# Patient Record
Sex: FEMALE | Race: BLACK OR AFRICAN AMERICAN | HISPANIC OR LATINO
[De-identification: names, ages, dates, MRNs, and addresses within clinical notes are randomized per-mention and may not be internally consistent; named-entity substitution may affect disease eponyms.]

---

## 2022-11-17 PROBLEM — Z00.00 ENCOUNTER FOR PREVENTIVE HEALTH EXAMINATION: Status: ACTIVE | Noted: 2022-11-17

## 2022-11-22 ENCOUNTER — APPOINTMENT (OUTPATIENT)
Dept: OBGYN | Facility: CLINIC | Age: 42
End: 2022-11-22

## 2023-04-13 ENCOUNTER — NON-APPOINTMENT (OUTPATIENT)
Age: 43
End: 2023-04-13

## 2023-04-14 ENCOUNTER — APPOINTMENT (OUTPATIENT)
Dept: OBGYN | Facility: CLINIC | Age: 43
End: 2023-04-14

## 2023-04-15 ENCOUNTER — NON-APPOINTMENT (OUTPATIENT)
Age: 43
End: 2023-04-15

## 2023-05-02 ENCOUNTER — APPOINTMENT (OUTPATIENT)
Dept: OBGYN | Facility: CLINIC | Age: 43
End: 2023-05-02
Payer: MEDICARE

## 2023-05-02 VITALS
DIASTOLIC BLOOD PRESSURE: 60 MMHG | SYSTOLIC BLOOD PRESSURE: 100 MMHG | BODY MASS INDEX: 30.22 KG/M2 | WEIGHT: 177 LBS | HEIGHT: 64 IN

## 2023-05-02 DIAGNOSIS — R87.810 CERVICAL HIGH RISK HUMAN PAPILLOMAVIRUS (HPV) DNA TEST POSITIVE: ICD-10-CM

## 2023-05-02 DIAGNOSIS — R23.2 FLUSHING: ICD-10-CM

## 2023-05-02 DIAGNOSIS — Z78.9 OTHER SPECIFIED HEALTH STATUS: ICD-10-CM

## 2023-05-02 DIAGNOSIS — Z82.0 FAMILY HISTORY OF EPILEPSY AND OTHER DISEASES OF THE NERVOUS SYSTEM: ICD-10-CM

## 2023-05-02 DIAGNOSIS — G62.9 POLYNEUROPATHY, UNSPECIFIED: ICD-10-CM

## 2023-05-02 DIAGNOSIS — Z82.49 FAMILY HISTORY OF ISCHEMIC HEART DISEASE AND OTHER DISEASES OF THE CIRCULATORY SYSTEM: ICD-10-CM

## 2023-05-02 DIAGNOSIS — Z87.42 PERSONAL HISTORY OF OTHER DISEASES OF THE FEMALE GENITAL TRACT: ICD-10-CM

## 2023-05-02 DIAGNOSIS — Z83.49 FAMILY HISTORY OF OTHER ENDOCRINE, NUTRITIONAL AND METABOLIC DISEASES: ICD-10-CM

## 2023-05-02 PROCEDURE — 99203 OFFICE O/P NEW LOW 30 MIN: CPT | Mod: 25

## 2023-05-03 ENCOUNTER — TRANSCRIPTION ENCOUNTER (OUTPATIENT)
Age: 43
End: 2023-05-03

## 2023-05-03 LAB
ALBUMIN SERPL ELPH-MCNC: 4.5 G/DL
ALP BLD-CCNC: 51 U/L
ALT SERPL-CCNC: 12 U/L
ANION GAP SERPL CALC-SCNC: 11 MMOL/L
AST SERPL-CCNC: 19 U/L
BILIRUB SERPL-MCNC: 0.3 MG/DL
BUN SERPL-MCNC: 17 MG/DL
C TRACH RRNA SPEC QL NAA+PROBE: NOT DETECTED
CALCIUM SERPL-MCNC: 9.1 MG/DL
CHLORIDE SERPL-SCNC: 102 MMOL/L
CO2 SERPL-SCNC: 26 MMOL/L
CREAT SERPL-MCNC: 0.74 MG/DL
DHEA-S SERPL-MCNC: 89.2 UG/DL
EGFR: 103 ML/MIN/1.73M2
ESTIMATED AVERAGE GLUCOSE: 97 MG/DL
ESTRADIOL SERPL-MCNC: 254 PG/ML
FSH SERPL-MCNC: 5.8 IU/L
GLUCOSE SERPL-MCNC: 84 MG/DL
HBA1C MFR BLD HPLC: 5 %
HBV SURFACE AG SER QL: NONREACTIVE
HCT VFR BLD CALC: 40.5 %
HCV AB SER QL: NONREACTIVE
HCV S/CO RATIO: 0.08 S/CO
HGB BLD-MCNC: 13 G/DL
HIV1+2 AB SPEC QL IA.RAPID: NONREACTIVE
INSULIN SERPL-MCNC: <0.4 UU/ML
LH SERPL-ACNC: 10.9 IU/L
MCHC RBC-ENTMCNC: 32.1 GM/DL
MCHC RBC-ENTMCNC: 33.7 PG
MCV RBC AUTO: 104.9 FL
N GONORRHOEA RRNA SPEC QL NAA+PROBE: NOT DETECTED
PLATELET # BLD AUTO: 236 K/UL
POTASSIUM SERPL-SCNC: 4.1 MMOL/L
PROGEST SERPL-MCNC: 4.3 NG/ML
PROLACTIN SERPL-MCNC: 9.4 NG/ML
PROT SERPL-MCNC: 7.3 G/DL
RBC # BLD: 3.86 M/UL
RBC # FLD: 13.6 %
SODIUM SERPL-SCNC: 139 MMOL/L
SOURCE AMPLIFICATION: NORMAL
SOURCE TP AMPLIFICATION: NORMAL
T PALLIDUM AB SER QL IA: NEGATIVE
T VAGINALIS RRNA SPEC QL NAA+PROBE: NOT DETECTED
T4 FREE SERPL-MCNC: 0.8 NG/DL
TESTOST SERPL-MCNC: 12.2 NG/DL
TSH SERPL-ACNC: 0.98 UIU/ML
WBC # FLD AUTO: 5.7 K/UL

## 2023-05-04 PROBLEM — Z78.9 DOES NOT USE ILLICIT DRUGS: Status: ACTIVE | Noted: 2023-05-04

## 2023-05-04 PROBLEM — Z78.9 NON-SMOKER: Status: ACTIVE | Noted: 2023-05-04

## 2023-05-04 PROBLEM — G62.9 POLYNEUROPATHY: Status: ACTIVE | Noted: 2023-05-04

## 2023-05-04 PROBLEM — Z83.49 FAMILY HISTORY OF THYROID DISEASE: Status: ACTIVE | Noted: 2023-05-04

## 2023-05-04 PROBLEM — Z87.42 HISTORY OF TERMINATION OF PREGNANCY: Status: RESOLVED | Noted: 2023-05-04 | Resolved: 2023-05-04

## 2023-05-04 PROBLEM — Z82.0 FAMILY HISTORY OF SLEEP APNEA: Status: ACTIVE | Noted: 2023-05-04

## 2023-05-04 PROBLEM — Z82.49 FAMILY HISTORY OF ACUTE MYOCARDIAL INFARCTION: Status: ACTIVE | Noted: 2023-05-04

## 2023-05-04 PROBLEM — Z78.9 NO HISTORY OF ALCOHOL USE: Status: ACTIVE | Noted: 2023-05-04

## 2023-05-04 LAB
HPV HIGH+LOW RISK DNA PNL CVX: NOT DETECTED
T3FREE SERPL-MCNC: 2.11 PG/ML

## 2023-05-04 NOTE — REVIEW OF SYSTEMS
[Night Sweats] : night sweats [Recent Weight Gain (___ Lbs)] : recent [unfilled] ~Ulb weight gain [Nausea] : nausea [Myalgias] : myalgias [Joint Stiffness] : joint stiffness [Back Pain] : back pain [Hot Flashes] : hot flashes [Easy Bruising] : easy bruising [Negative] : Heme/Lymph

## 2023-05-04 NOTE — PLAN
[FreeTextEntry1] : TVUS\par Blood work drawn.\par Discussed decreased fertility due to age and ev ivf iui etc.\par \par \par Mammo/sono yearly.

## 2023-05-04 NOTE — HISTORY OF PRESENT ILLNESS
[PGHxTotal] : 2 [[facility] : [facility] [PGHxPremature] : 0 [PGHxAbortions] : 1 [[facility] Centeriving] : 1 [PGHxABInduced] : 1 [FreeTextEntry1] : 2009 c/s 37 weeks 5#13 oligohydramnios female [Frequency: Q ___ days] : menstrual periods occur approximately every [unfilled] days [Menarche Age: ____] : age at menarche was [unfilled] [Currently Active] : currently active [Men] : men [Vaginal] : vaginal

## 2023-05-05 ENCOUNTER — NON-APPOINTMENT (OUTPATIENT)
Age: 43
End: 2023-05-05

## 2023-05-08 LAB — CYTOLOGY CVX/VAG DOC THIN PREP: ABNORMAL

## 2023-05-30 ENCOUNTER — APPOINTMENT (OUTPATIENT)
Dept: OBGYN | Facility: CLINIC | Age: 43
End: 2023-05-30
Payer: MEDICARE

## 2023-05-30 VITALS
SYSTOLIC BLOOD PRESSURE: 106 MMHG | HEIGHT: 64 IN | DIASTOLIC BLOOD PRESSURE: 64 MMHG | WEIGHT: 178 LBS | BODY MASS INDEX: 30.39 KG/M2

## 2023-05-30 DIAGNOSIS — N83.8 OTHER NONINFLAMMATORY DISORDERS OF OVARY, FALLOPIAN TUBE AND BROAD LIGAMENT: ICD-10-CM

## 2023-05-30 PROCEDURE — 99213 OFFICE O/P EST LOW 20 MIN: CPT | Mod: 25

## 2023-05-30 PROCEDURE — 76830 TRANSVAGINAL US NON-OB: CPT

## 2023-05-30 RX ORDER — AMOXICILLIN 500 MG/1
500 CAPSULE ORAL
Qty: 15 | Refills: 0 | Status: COMPLETED | COMMUNITY
Start: 2022-12-13

## 2023-05-30 RX ORDER — ETODOLAC 400 MG/1
400 TABLET, FILM COATED ORAL
Qty: 10 | Refills: 0 | Status: COMPLETED | COMMUNITY
Start: 2022-12-13

## 2023-05-30 RX ORDER — IBUPROFEN 600 MG/1
600 TABLET, FILM COATED ORAL
Qty: 20 | Refills: 0 | Status: COMPLETED | COMMUNITY
Start: 2023-02-27

## 2023-05-30 RX ORDER — ACETAMINOPHEN EXTRA STRENGTH 500 MG/1
500 TABLET ORAL
Qty: 30 | Refills: 0 | Status: COMPLETED | COMMUNITY
Start: 2023-02-27

## 2023-05-30 NOTE — PLAN
[FreeTextEntry1] : HSG \par Semen analysis\par Discussed ev ivf and iui.  Desires clomid.\par Discussed conjugal visits and timing.\par Subclinical hypothyroid, reviewed synthroid.  Patient has an endo appt.  Will discuss at fu sono in 1 month after hsg and endo appt.

## 2023-06-20 ENCOUNTER — NON-APPOINTMENT (OUTPATIENT)
Age: 43
End: 2023-06-20

## 2023-06-20 ENCOUNTER — APPOINTMENT (OUTPATIENT)
Dept: ENDOCRINOLOGY | Facility: CLINIC | Age: 43
End: 2023-06-20
Payer: MEDICARE

## 2023-06-20 VITALS
WEIGHT: 178 LBS | SYSTOLIC BLOOD PRESSURE: 120 MMHG | OXYGEN SATURATION: 98 % | BODY MASS INDEX: 30.39 KG/M2 | HEART RATE: 67 BPM | DIASTOLIC BLOOD PRESSURE: 70 MMHG | TEMPERATURE: 36.6 F | HEIGHT: 64 IN

## 2023-06-20 PROCEDURE — 99203 OFFICE O/P NEW LOW 30 MIN: CPT

## 2023-06-20 NOTE — PHYSICAL EXAM
[de-identified] : General: No distress, well nourished\par Eyes: Normal Sclera, EOMI, PERRL\par ENT: Normal appearance of the nose, normal oropharynx\par Neck/Thyroid: No cervical lymphadenopathy, thyroid gland 20 g in size, no thyroid nodules, non-tender\par Respiratory: No use of accessory muscles of respiration, vesicular breath sounds heard bilaterally, no crepitations or ronchi\par Cardiovascular: S1 and S2 heard and normal, no S3 or S4, no murmurs, radial pulse normal bilaterally\par Abdomen: soft, non-tender, no masses, normal bowel sounds\par Musculoskeletal: No swelling or deformities of joints of hands, no pedal edema\par Neurological: Normal range of motion in the hands, Normal brachioradialis reflexes bilaterally\par Psychiatry: Patient converses normally, good judgement and insight\par Skin: No rashes in hands, no nodules palpated in hands

## 2023-06-20 NOTE — HISTORY OF PRESENT ILLNESS
[FreeTextEntry1] : Problems:\par 1. Low Free T4\par \par Note - patient is attempting to get pregnant\par \par Low Free T4\par 1. Diagnosed in May 2023\par 2. Labs:\par 05/02/2023 - TSH N, Free T4 - 0.8 (0.9 to 1.8)\par 3. Mother and grandmother had unknown thyroid disorder, no family history of thyroid cancer, no personal history of radiation therapy\par 4. No family history of pituitary disorders\par 5. No loss of peripheral vision, has headaches intermittently

## 2023-06-20 NOTE — ASSESSMENT
[FreeTextEntry1] : In May 2023, the TSH was normal and the Free T4 was low. I ordered a repeat TSH and Free T4 and I also ordered a Free T4 via equilibrium dialysis.\par \par Plan:\par 1. Labs to be done today - see below\par 2. Follow up in 2 weeks to review results - telehealth visit ok

## 2023-06-21 LAB
T4 FREE SERPL-MCNC: 0.9 NG/DL
TSH SERPL-ACNC: 0.82 UIU/ML

## 2023-06-27 ENCOUNTER — APPOINTMENT (OUTPATIENT)
Dept: OBGYN | Facility: CLINIC | Age: 43
End: 2023-06-27
Payer: MEDICARE

## 2023-06-27 VITALS
DIASTOLIC BLOOD PRESSURE: 60 MMHG | BODY MASS INDEX: 29.88 KG/M2 | SYSTOLIC BLOOD PRESSURE: 110 MMHG | WEIGHT: 175 LBS | HEIGHT: 64 IN

## 2023-06-27 DIAGNOSIS — N97.9 FEMALE INFERTILITY, UNSPECIFIED: ICD-10-CM

## 2023-06-27 DIAGNOSIS — E05.90 THYROTOXICOSIS, UNSPECIFIED W/OUT THYROTOXIC CRISIS OR STORM: ICD-10-CM

## 2023-06-27 DIAGNOSIS — N83.00 "FOLLICULAR CYST OF OVARY, UNSPECIFIED SIDE": ICD-10-CM

## 2023-06-27 PROCEDURE — 76830 TRANSVAGINAL US NON-OB: CPT

## 2023-06-27 PROCEDURE — 99213 OFFICE O/P EST LOW 20 MIN: CPT | Mod: 25

## 2023-06-28 LAB — T4 FREE SERPL DIALY-MCNC: 0.83 NG/DL

## 2023-07-03 PROBLEM — N97.9 FEMALE INFERTILITY: Status: ACTIVE | Noted: 2023-05-30

## 2023-07-03 PROBLEM — N83.00 OVARIAN CYST, FOLLICULAR: Status: ACTIVE | Noted: 2023-07-03

## 2023-07-07 ENCOUNTER — RESULT REVIEW (OUTPATIENT)
Age: 43
End: 2023-07-07

## 2023-07-07 ENCOUNTER — OUTPATIENT (OUTPATIENT)
Dept: OUTPATIENT SERVICES | Facility: HOSPITAL | Age: 43
LOS: 1 days | End: 2023-07-07
Payer: COMMERCIAL

## 2023-07-07 ENCOUNTER — APPOINTMENT (OUTPATIENT)
Dept: RADIOLOGY | Facility: HOSPITAL | Age: 43
End: 2023-07-07
Payer: MEDICARE

## 2023-07-07 DIAGNOSIS — Z00.00 ENCOUNTER FOR GENERAL ADULT MEDICAL EXAMINATION WITHOUT ABNORMAL FINDINGS: ICD-10-CM

## 2023-07-07 DIAGNOSIS — N97.9 FEMALE INFERTILITY, UNSPECIFIED: ICD-10-CM

## 2023-07-07 PROCEDURE — 74740 X-RAY FEMALE GENITAL TRACT: CPT

## 2023-07-07 PROCEDURE — 74740 X-RAY FEMALE GENITAL TRACT: CPT | Mod: 26

## 2023-07-07 PROCEDURE — 58340 CATHETER FOR HYSTEROGRAPHY: CPT

## 2023-07-11 ENCOUNTER — APPOINTMENT (OUTPATIENT)
Dept: OBGYN | Facility: CLINIC | Age: 43
End: 2023-07-11
Payer: MEDICARE

## 2023-07-11 DIAGNOSIS — N95.1 MENOPAUSAL AND FEMALE CLIMACTERIC STATES: ICD-10-CM

## 2023-07-11 PROCEDURE — 99213 OFFICE O/P EST LOW 20 MIN: CPT | Mod: 95

## 2023-07-11 NOTE — HISTORY OF PRESENT ILLNESS
[FreeTextEntry1] : Telehealth\par Patient at home in [facility]\par Myself in [facility] office.\par Discussed telehealth and options.  Insurance will be billed.\par Heavy menses and perimenopause.  Desires pregnancy.

## 2023-07-18 ENCOUNTER — APPOINTMENT (OUTPATIENT)
Dept: ENDOCRINOLOGY | Facility: CLINIC | Age: 43
End: 2023-07-18
Payer: MEDICARE

## 2023-07-18 DIAGNOSIS — R79.89 OTHER SPECIFIED ABNORMAL FINDINGS OF BLOOD CHEMISTRY: ICD-10-CM

## 2023-07-18 PROCEDURE — 99213 OFFICE O/P EST LOW 20 MIN: CPT | Mod: 95

## 2023-07-18 NOTE — REASON FOR VISIT
..Left a message for patient to please call us back.     [Home] : at home, [unfilled] , at the time of the visit. [Medical Office: ([facility])___] : at the medical office located in  [Patient] : the patient [Follow - Up] : a follow-up visit [Other___] : [unfilled]

## 2023-07-18 NOTE — ASSESSMENT
[FreeTextEntry1] : In May 2023, the TSH was normal and the Free T4 was low.\par \par In June 2023, TSH,  Free T4 and  Free T4 via equilibrium dialysis were normal. \par I will monitor her thyroid function tests. \par \par Plan:\par 1. Labs to be done in 3 months - see below\par 2. Follow up in 3 months to review results - telehealth visit ok

## 2023-07-18 NOTE — PHYSICAL EXAM
[de-identified] : General: No distress, well nourished\par Eyes: Normal external appearance\par ENT: Normal appearance of the nose\par Neck/Thyroid: No visible neck swelling\par Respiratory: No use of accessory muscles of respiration\par Psychiatry: Patient converses normally, good judgement and insight\par Skin: No rashes seen on face

## 2023-07-18 NOTE — HISTORY OF PRESENT ILLNESS
[FreeTextEntry1] : Problems:\par 1. Low Free T4\par \par Note - patient is attempting to get pregnant\par \par Low Free T4\par 1. Diagnosed in May 2023\par 2. Labs:\par 05/02/2023 - TSH N, Free T4 - 0.8 (0.9 to 1.8)\par June 2023 - TSH, Free T4 and Free T4 via equilibrium dialysis were normal. \par 3. Mother and grandmother had unknown thyroid disorder, no family history of thyroid cancer, no personal history of radiation therapy\par 4. No family history of pituitary disorders\par 5. No loss of peripheral vision, has headaches intermittently

## 2023-09-19 ENCOUNTER — APPOINTMENT (OUTPATIENT)
Dept: OBGYN | Facility: CLINIC | Age: 43
End: 2023-09-19
Payer: MEDICARE

## 2023-09-19 VITALS
HEIGHT: 64 IN | DIASTOLIC BLOOD PRESSURE: 70 MMHG | BODY MASS INDEX: 30.73 KG/M2 | WEIGHT: 180 LBS | SYSTOLIC BLOOD PRESSURE: 110 MMHG

## 2023-09-19 DIAGNOSIS — N92.0 EXCESSIVE AND FREQUENT MENSTRUATION WITH REGULAR CYCLE: ICD-10-CM

## 2023-09-19 DIAGNOSIS — E03.8 OTHER SPECIFIED HYPOTHYROIDISM: ICD-10-CM

## 2023-09-19 PROCEDURE — 99213 OFFICE O/P EST LOW 20 MIN: CPT | Mod: 25

## 2023-09-19 RX ORDER — LEVOTHYROXINE SODIUM 0.03 MG/1
25 TABLET ORAL DAILY
Qty: 30 | Refills: 2 | Status: ACTIVE | COMMUNITY
Start: 2023-09-19 | End: 1900-01-01

## 2023-09-19 RX ORDER — CLOMIPHENE CITRATE 50 MG/1
50 TABLET ORAL DAILY
Qty: 5 | Refills: 2 | Status: ACTIVE | COMMUNITY
Start: 2023-07-11 | End: 1900-01-01

## 2023-09-19 RX ORDER — MEDROXYPROGESTERONE ACETATE 10 MG/1
10 TABLET ORAL DAILY
Qty: 7 | Refills: 0 | Status: ACTIVE | COMMUNITY
Start: 2023-09-19 | End: 1900-01-01